# Patient Record
Sex: FEMALE | Race: WHITE | Employment: OTHER | URBAN - METROPOLITAN AREA
[De-identification: names, ages, dates, MRNs, and addresses within clinical notes are randomized per-mention and may not be internally consistent; named-entity substitution may affect disease eponyms.]

---

## 2019-08-25 ENCOUNTER — APPOINTMENT (OUTPATIENT)
Dept: CT IMAGING | Facility: CLINIC | Age: 59
End: 2019-08-25
Attending: FAMILY MEDICINE
Payer: COMMERCIAL

## 2019-08-25 ENCOUNTER — APPOINTMENT (OUTPATIENT)
Dept: GENERAL RADIOLOGY | Facility: CLINIC | Age: 59
End: 2019-08-25
Attending: FAMILY MEDICINE
Payer: COMMERCIAL

## 2019-08-25 ENCOUNTER — HOSPITAL ENCOUNTER (EMERGENCY)
Facility: CLINIC | Age: 59
Discharge: ANOTHER HOSPITAL | End: 2019-08-25
Attending: FAMILY MEDICINE | Admitting: FAMILY MEDICINE
Payer: COMMERCIAL

## 2019-08-25 VITALS
WEIGHT: 220.46 LBS | RESPIRATION RATE: 24 BRPM | HEART RATE: 82 BPM | OXYGEN SATURATION: 95 % | SYSTOLIC BLOOD PRESSURE: 132 MMHG | DIASTOLIC BLOOD PRESSURE: 95 MMHG | TEMPERATURE: 97.7 F | BODY MASS INDEX: 37.64 KG/M2 | HEIGHT: 64 IN

## 2019-08-25 DIAGNOSIS — I46.9 CARDIAC ARREST (H): ICD-10-CM

## 2019-08-25 DIAGNOSIS — E87.5 HYPERKALEMIA: ICD-10-CM

## 2019-08-25 DIAGNOSIS — R73.9 HYPERGLYCEMIA: ICD-10-CM

## 2019-08-25 LAB
ALBUMIN SERPL-MCNC: 3.2 G/DL (ref 3.4–5)
ALBUMIN UR-MCNC: 100 MG/DL
ALP SERPL-CCNC: 320 U/L (ref 40–150)
ALT SERPL W P-5'-P-CCNC: 432 U/L (ref 0–50)
AMORPH CRY #/AREA URNS HPF: ABNORMAL /HPF
ANION GAP SERPL CALCULATED.3IONS-SCNC: 6 MMOL/L (ref 3–14)
ANION GAP SERPL CALCULATED.3IONS-SCNC: 8 MMOL/L (ref 3–14)
APPEARANCE UR: ABNORMAL
AST SERPL W P-5'-P-CCNC: 382 U/L (ref 0–45)
BASE DEFICIT BLDV-SCNC: 4.3 MMOL/L
BASE DEFICIT BLDV-SCNC: 7.6 MMOL/L
BASE DEFICIT BLDV-SCNC: 7.9 MMOL/L
BASOPHILS # BLD AUTO: 0.1 10E9/L (ref 0–0.2)
BASOPHILS NFR BLD AUTO: 0.3 %
BILIRUB SERPL-MCNC: 0.7 MG/DL (ref 0.2–1.3)
BILIRUB UR QL STRIP: NEGATIVE
BUN SERPL-MCNC: 40 MG/DL (ref 7–30)
BUN SERPL-MCNC: 46 MG/DL (ref 7–30)
CALCIUM SERPL-MCNC: 7.5 MG/DL (ref 8.5–10.1)
CALCIUM SERPL-MCNC: 9.2 MG/DL (ref 8.5–10.1)
CHLORIDE SERPL-SCNC: 100 MMOL/L (ref 94–109)
CHLORIDE SERPL-SCNC: 111 MMOL/L (ref 94–109)
CO2 SERPL-SCNC: 24 MMOL/L (ref 20–32)
CO2 SERPL-SCNC: 26 MMOL/L (ref 20–32)
COLOR UR AUTO: YELLOW
CREAT SERPL-MCNC: 1.28 MG/DL (ref 0.52–1.04)
CREAT SERPL-MCNC: 1.53 MG/DL (ref 0.52–1.04)
DIFFERENTIAL METHOD BLD: ABNORMAL
EOSINOPHIL # BLD AUTO: 0 10E9/L (ref 0–0.7)
EOSINOPHIL NFR BLD AUTO: 0 %
ERYTHROCYTE [DISTWIDTH] IN BLOOD BY AUTOMATED COUNT: 14.5 % (ref 10–15)
ETHANOL SERPL-MCNC: <0.01 G/DL
GFR SERPL CREATININE-BSD FRML MDRD: 37 ML/MIN/{1.73_M2}
GFR SERPL CREATININE-BSD FRML MDRD: 46 ML/MIN/{1.73_M2}
GLUCOSE BLDC GLUCOMTR-MCNC: 482 MG/DL (ref 70–99)
GLUCOSE BLDC GLUCOMTR-MCNC: 503 MG/DL (ref 70–99)
GLUCOSE SERPL-MCNC: 352 MG/DL (ref 70–99)
GLUCOSE SERPL-MCNC: 587 MG/DL (ref 70–99)
GLUCOSE UR STRIP-MCNC: >499 MG/DL
HCO3 BLDV-SCNC: 23 MMOL/L (ref 21–28)
HCO3 BLDV-SCNC: 24 MMOL/L (ref 21–28)
HCO3 BLDV-SCNC: 24 MMOL/L (ref 21–28)
HCT VFR BLD AUTO: 47.1 % (ref 35–47)
HGB BLD-MCNC: 14.4 G/DL (ref 11.7–15.7)
HGB UR QL STRIP: ABNORMAL
IMM GRANULOCYTES # BLD: 0.3 10E9/L (ref 0–0.4)
IMM GRANULOCYTES NFR BLD: 1.6 %
KETONES BLD-SCNC: 0.5 MMOL/L (ref 0–0.6)
KETONES UR STRIP-MCNC: NEGATIVE MG/DL
LACTATE BLD-SCNC: 3.3 MMOL/L (ref 0.7–2)
LACTATE BLD-SCNC: 4.5 MMOL/L (ref 0.7–2)
LEUKOCYTE ESTERASE UR QL STRIP: NEGATIVE
LYMPHOCYTES # BLD AUTO: 0.8 10E9/L (ref 0.8–5.3)
LYMPHOCYTES NFR BLD AUTO: 3.8 %
MCH RBC QN AUTO: 31.6 PG (ref 26.5–33)
MCHC RBC AUTO-ENTMCNC: 30.6 G/DL (ref 31.5–36.5)
MCV RBC AUTO: 104 FL (ref 78–100)
MONOCYTES # BLD AUTO: 2 10E9/L (ref 0–1.3)
MONOCYTES NFR BLD AUTO: 10.3 %
MUCOUS THREADS #/AREA URNS LPF: PRESENT /LPF
NEUTROPHILS # BLD AUTO: 16.6 10E9/L (ref 1.6–8.3)
NEUTROPHILS NFR BLD AUTO: 84 %
NITRATE UR QL: NEGATIVE
NRBC # BLD AUTO: 0 10*3/UL
NRBC BLD AUTO-RTO: 0 /100
O2/TOTAL GAS SETTING VFR VENT: 40 %
O2/TOTAL GAS SETTING VFR VENT: 40 %
O2/TOTAL GAS SETTING VFR VENT: ABNORMAL %
PCO2 BLDV: 60 MM HG (ref 40–50)
PCO2 BLDV: 78 MM HG (ref 40–50)
PCO2 BLDV: 82 MM HG (ref 40–50)
PH BLDV: 7.08 PH (ref 7.32–7.43)
PH BLDV: 7.08 PH (ref 7.32–7.43)
PH BLDV: 7.22 PH (ref 7.32–7.43)
PH UR STRIP: 5 PH (ref 5–7)
PLATELET # BLD AUTO: 422 10E9/L (ref 150–450)
PO2 BLDV: 17 MM HG (ref 25–47)
PO2 BLDV: 17 MM HG (ref 25–47)
PO2 BLDV: 22 MM HG (ref 25–47)
POTASSIUM SERPL-SCNC: 5.2 MMOL/L (ref 3.4–5.3)
POTASSIUM SERPL-SCNC: 6.1 MMOL/L (ref 3.4–5.3)
PROT SERPL-MCNC: 7.4 G/DL (ref 6.8–8.8)
RBC # BLD AUTO: 4.55 10E12/L (ref 3.8–5.2)
RBC #/AREA URNS AUTO: 6 /HPF (ref 0–2)
SODIUM SERPL-SCNC: 134 MMOL/L (ref 133–144)
SODIUM SERPL-SCNC: 141 MMOL/L (ref 133–144)
SOURCE: ABNORMAL
SP GR UR STRIP: 1.02 (ref 1–1.03)
UROBILINOGEN UR STRIP-MCNC: 0 MG/DL (ref 0–2)
WBC # BLD AUTO: 19.8 10E9/L (ref 4–11)
WBC #/AREA URNS AUTO: 5 /HPF (ref 0–5)

## 2019-08-25 PROCEDURE — 81001 URINALYSIS AUTO W/SCOPE: CPT | Performed by: FAMILY MEDICINE

## 2019-08-25 PROCEDURE — 99292 CRITICAL CARE ADDL 30 MIN: CPT

## 2019-08-25 PROCEDURE — 82803 BLOOD GASES ANY COMBINATION: CPT | Performed by: FAMILY MEDICINE

## 2019-08-25 PROCEDURE — 25000131 ZZH RX MED GY IP 250 OP 636 PS 637: Performed by: FAMILY MEDICINE

## 2019-08-25 PROCEDURE — 96376 TX/PRO/DX INJ SAME DRUG ADON: CPT

## 2019-08-25 PROCEDURE — 85025 COMPLETE CBC W/AUTO DIFF WBC: CPT | Performed by: FAMILY MEDICINE

## 2019-08-25 PROCEDURE — 93005 ELECTROCARDIOGRAM TRACING: CPT | Mod: 76

## 2019-08-25 PROCEDURE — 25800030 ZZH RX IP 258 OP 636: Performed by: FAMILY MEDICINE

## 2019-08-25 PROCEDURE — 70450 CT HEAD/BRAIN W/O DYE: CPT

## 2019-08-25 PROCEDURE — 82010 KETONE BODYS QUAN: CPT | Performed by: FAMILY MEDICINE

## 2019-08-25 PROCEDURE — 00000146 ZZHCL STATISTIC GLUCOSE BY METER IP

## 2019-08-25 PROCEDURE — 25000128 H RX IP 250 OP 636: Performed by: FAMILY MEDICINE

## 2019-08-25 PROCEDURE — 87040 BLOOD CULTURE FOR BACTERIA: CPT | Performed by: FAMILY MEDICINE

## 2019-08-25 PROCEDURE — 93005 ELECTROCARDIOGRAM TRACING: CPT

## 2019-08-25 PROCEDURE — 71045 X-RAY EXAM CHEST 1 VIEW: CPT

## 2019-08-25 PROCEDURE — 99292 CRITICAL CARE ADDL 30 MIN: CPT | Mod: Z6 | Performed by: FAMILY MEDICINE

## 2019-08-25 PROCEDURE — 93010 ELECTROCARDIOGRAM REPORT: CPT | Mod: 76 | Performed by: FAMILY MEDICINE

## 2019-08-25 PROCEDURE — 80053 COMPREHEN METABOLIC PANEL: CPT | Performed by: FAMILY MEDICINE

## 2019-08-25 PROCEDURE — 87086 URINE CULTURE/COLONY COUNT: CPT | Performed by: FAMILY MEDICINE

## 2019-08-25 PROCEDURE — 80320 DRUG SCREEN QUANTALCOHOLS: CPT | Performed by: FAMILY MEDICINE

## 2019-08-25 PROCEDURE — 80048 BASIC METABOLIC PNL TOTAL CA: CPT | Performed by: FAMILY MEDICINE

## 2019-08-25 PROCEDURE — 99291 CRITICAL CARE FIRST HOUR: CPT | Mod: 25

## 2019-08-25 PROCEDURE — 40000275 ZZH STATISTIC RCP TIME EA 10 MIN

## 2019-08-25 PROCEDURE — 99291 CRITICAL CARE FIRST HOUR: CPT | Mod: 25 | Performed by: FAMILY MEDICINE

## 2019-08-25 PROCEDURE — 31500 INSERT EMERGENCY AIRWAY: CPT

## 2019-08-25 PROCEDURE — 96374 THER/PROPH/DIAG INJ IV PUSH: CPT

## 2019-08-25 PROCEDURE — 96375 TX/PRO/DX INJ NEW DRUG ADDON: CPT

## 2019-08-25 PROCEDURE — 93010 ELECTROCARDIOGRAM REPORT: CPT | Mod: Z6 | Performed by: FAMILY MEDICINE

## 2019-08-25 PROCEDURE — 40000281 ZZH STATISTIC TRANSPORT TIME EA 15 MIN

## 2019-08-25 PROCEDURE — 83605 ASSAY OF LACTIC ACID: CPT | Performed by: FAMILY MEDICINE

## 2019-08-25 PROCEDURE — 51702 INSERT TEMP BLADDER CATH: CPT

## 2019-08-25 PROCEDURE — 25000125 ZZHC RX 250: Performed by: FAMILY MEDICINE

## 2019-08-25 PROCEDURE — 71275 CT ANGIOGRAPHY CHEST: CPT

## 2019-08-25 PROCEDURE — 40000276 ZZH STATISTIC RCP TIME ED VENT EA 10 MIN

## 2019-08-25 PROCEDURE — 74177 CT ABD & PELVIS W/CONTRAST: CPT

## 2019-08-25 RX ORDER — IOPAMIDOL 755 MG/ML
60 INJECTION, SOLUTION INTRAVASCULAR ONCE
Status: COMPLETED | OUTPATIENT
Start: 2019-08-25 | End: 2019-08-25

## 2019-08-25 RX ORDER — CEFAZOLIN SODIUM 1 G/50ML
2000 SOLUTION INTRAVENOUS EVERY 24 HOURS
Status: DISCONTINUED | OUTPATIENT
Start: 2019-08-25 | End: 2019-08-25 | Stop reason: HOSPADM

## 2019-08-25 RX ORDER — IOPAMIDOL 755 MG/ML
89 INJECTION, SOLUTION INTRAVASCULAR ONCE
Status: COMPLETED | OUTPATIENT
Start: 2019-08-25 | End: 2019-08-25

## 2019-08-25 RX ORDER — LIRAGLUTIDE 6 MG/ML
INJECTION SUBCUTANEOUS
COMMUNITY
Start: 2019-08-14

## 2019-08-25 RX ORDER — MIRTAZAPINE 15 MG/1
15 TABLET, FILM COATED ORAL AT BEDTIME
COMMUNITY
Start: 2019-07-19

## 2019-08-25 RX ORDER — LORAZEPAM 2 MG/ML
2 INJECTION INTRAMUSCULAR ONCE
Status: DISCONTINUED | OUTPATIENT
Start: 2019-08-25 | End: 2019-08-25 | Stop reason: HOSPADM

## 2019-08-25 RX ORDER — CLONAZEPAM 0.5 MG/1
0.25 TABLET ORAL DAILY
COMMUNITY
Start: 2019-08-12

## 2019-08-25 RX ORDER — DEXTROSE MONOHYDRATE 25 G/50ML
25-50 INJECTION, SOLUTION INTRAVENOUS
Status: DISCONTINUED | OUTPATIENT
Start: 2019-08-25 | End: 2019-08-25 | Stop reason: HOSPADM

## 2019-08-25 RX ORDER — METOPROLOL TARTRATE 50 MG
50 TABLET ORAL EVERY 12 HOURS
COMMUNITY
Start: 2019-08-24 | End: 2020-08-18

## 2019-08-25 RX ORDER — ROSUVASTATIN CALCIUM 20 MG/1
20 TABLET, COATED ORAL DAILY
COMMUNITY
Start: 2019-07-05 | End: 2020-06-29

## 2019-08-25 RX ORDER — CALCIUM GLUCONATE 94 MG/ML
1 INJECTION, SOLUTION INTRAVENOUS ONCE
Status: DISCONTINUED | OUTPATIENT
Start: 2019-08-25 | End: 2019-08-25 | Stop reason: DRUGHIGH

## 2019-08-25 RX ADMIN — SODIUM CHLORIDE 100 ML: 9 INJECTION, SOLUTION INTRAVENOUS at 16:44

## 2019-08-25 RX ADMIN — NOREPINEPHRINE BITARTRATE 0.03 MCG/KG/MIN: 1 INJECTION INTRAVENOUS at 15:12

## 2019-08-25 RX ADMIN — FENTANYL CITRATE 50 MCG/HR: 50 INJECTION, SOLUTION INTRAMUSCULAR; INTRAVENOUS at 15:07

## 2019-08-25 RX ADMIN — CEFEPIME HYDROCHLORIDE 2 G: 2 INJECTION, POWDER, FOR SOLUTION INTRAVENOUS at 16:59

## 2019-08-25 RX ADMIN — SODIUM CHLORIDE 100 ML: 9 INJECTION, SOLUTION INTRAVENOUS at 16:25

## 2019-08-25 RX ADMIN — SODIUM CHLORIDE 5.5 UNITS/HR: 9 INJECTION, SOLUTION INTRAVENOUS at 15:48

## 2019-08-25 RX ADMIN — FENTANYL CITRATE 50 MCG/HR: 50 INJECTION, SOLUTION INTRAMUSCULAR; INTRAVENOUS at 15:14

## 2019-08-25 RX ADMIN — VANCOMYCIN HYDROCHLORIDE 2000 MG: 10 INJECTION, POWDER, LYOPHILIZED, FOR SOLUTION INTRAVENOUS at 17:25

## 2019-08-25 RX ADMIN — IOPAMIDOL 60 ML: 755 INJECTION, SOLUTION INTRAVENOUS at 16:44

## 2019-08-25 RX ADMIN — IOPAMIDOL 75 ML: 755 INJECTION, SOLUTION INTRAVENOUS at 16:24

## 2019-08-25 RX ADMIN — NOREPINEPHRINE BITARTRATE 0.03 MCG/KG/MIN: 1 INJECTION INTRAVENOUS at 15:14

## 2019-08-25 RX ADMIN — INSULIN HUMAN 10 UNITS: 100 INJECTION, SOLUTION PARENTERAL at 15:22

## 2019-08-25 ASSESSMENT — MIFFLIN-ST. JEOR: SCORE: 1570

## 2019-08-25 NOTE — ED NOTES
DATE:  8/25/2019   TIME OF RECEIPT FROM LAB:  4686  LAB TEST:  lactate  LAB VALUE:  2.9  RESULTS GIVEN WITH READ-BACK TO (PROVIDER):  Maria De Jesus [facility] LAB VALUE REPORTED TO PROVIDER:   [id number]

## 2019-08-25 NOTE — ED TRIAGE NOTES
Pt arrived in triage with c/o elevated glucose level. Pt has been having some altered mental state through the night so family brought her in.

## 2019-08-25 NOTE — ED PROVIDER NOTES
History     Chief Complaint   Patient presents with     Hyperglycemia     HPI    Lindsey Diggs is a 59 year old female who was brought in by her  because of altered consciousness.  He said she is been acting somewhat abnormally and she is complained of lightheadedness.  They are here visiting a daughter and they live in [facility].  He stated that she completed 7 out of 7 electroconvulsive therapy treatments on 8/23, 2 days ago and that this was complicated by the development of atrial fibrillation necessitating hospitalization.  He said that she was put on a medication drip and the A. fib resolved.  She was not discharged on anticoagulation.  On her initial presentation we did not have any additional medical history but after her cardiac arrest I was able to access charts through care everywhere that shows that she is had a prior history of stroke affecting the brainstem for which she is on Plavix, type 2 diabetes currently on insulin, severe chronic depression, sleep apnea, and other medical problems as noted below.    On my initial assessment she smiled and greeted me.  As I was taking further history she began to become stiff and twist her head and she became unresponsive.  I thought she was developing a seizure and ordered lorazepam 2 mg to be given IV.  She then stopped breathing and lost her pulse and CPR was initiated.    Allergies  Reconcile with Patient's Chart    Active Allergy Reactions Severity Noted Date Comments   Penicillins Anaphylaxis   01/24/2005 Tolerated cephalexin     Medications  Reconcile with Patient's Chart    Medication Sig Dispensed Refills Start Date End Date Status   Lactobacillus-Inulin (PixtaE DIGESTIVE HEALTH) oral CAPS capsule   Take 1 capsule by mouth every morning before breakfast.   0     Active   ACCU-CHEK SMARTVIEW in vitro strips   TEST BLOOD SUGARS FOUR TIMES DAILY 400 strip   4 10/03/2018   Active   empagliflozin (JARDIANCE) 25 mg oral TABS tablet    Take 25 mg by mouth every day. 90 tablet   3 11/06/2018   Active   clopidogrel (PLAVIX) 75 mg oral tablet   Take 1 tablet (75 mg total) by mouth every day. 90 tablet   3 11/06/2018   Active   ACCU-CHEK FASTCLIX LANCETS misc. route MISC   USE TO TEST BLOOD SUGAR FOUR TIMES DAILY AND AS NEEDED. 300 each   3 11/06/2018   Active   Multiple Vitamins-Minerals (MULTIVITAMIN ADULTS) oral TABS tablet   Take 1 tablet by mouth every day.   0     Active   fosinopril (MONOPRIL) 10 mg oral tablet   Take 1 tablet (10 mg total) by mouth every day. 90 tablet   4 01/08/2019   Active   Cholecalciferol (VITAMIN D3) 5,000 Units oral TABS tablet   Take 5,000 Units by mouth every day .   0     Active   rosuvastatin (CRESTOR) 20 mg oral tablet   Take 1 tablet (20 mg total) by mouth every day. 30 tablet   11 07/05/2019 06/29/2020 Active   mirtazapine (REMERON) 15 mg oral tablet   Take 1 tablet (15 mg total) by mouth every night at bedtime. 30 tablet   1 07/19/2019   Active   clonazePAM (KLONOPIN) 0.5 mg oral tablet   Take 0.5 tablets (0.25 mg total) by mouth every day. 1 tablet   0 08/12/2019   Active   liraglutide (VICTOZA) 18 mg/3 mL subcutaneous injection    Indications: Diabetes Mellitus 0.6 mg subcutaneously x 7 days, then 1.2 mg subcut once daily. Indications: Diabetes 6 mL   5 08/14/2019   Active   metoprolol tartrate (LOPRESSOR) 50 mg oral tablet   Take 1 tablet (50 mg total) by mouth every 12 hours. 60 tablet   11 08/24/2019 08/18/2020 Active   acetaminophen (TYLENOL) 500 mg oral tablet   Take 500 mg by mouth every 6 hours as needed for pain .   0   08/20/2019 Discontinued   insulin glargine (LANTUS) 100 Units/mL subcutaneous injection   Inject 17 Units into the skin every 12 hours. 10 mL   11 11/06/2018 08/14/2019 Discontinued   insulin pen needle (BD PEN NEEDLE GRACIELA U/F) 32 G x 4 mm misc. route MISC   To be used to inject insulin 5 times per day. DX: 250.00. 450 each   3 11/06/2018 08/14/2019 Discontinued   Unclassified (OTHER)    "Take by mouth every day \"Restore - Gut capsule\" .   0   08/14/2019 Discontinued   LANTUS SOLOSTAR 100 Units/mL subcutaneous injection (pen)   INJECT 17 UNITS INTO THE SKIN Q 12 H   10 06/09/2019 08/14/2019 Discontinued   ginkgo biloba (GINKGO BILOBA) 40 mg oral tablet       0   08/20/2019 Discontinued   metoprolol tartrate (LOPRESSOR) 25 mg oral tablet   Take 0.5 tablets (12.5 mg total) by mouth twice daily. 30 tablet   0 07/19/2019 08/15/2019 Discontinued   DULoxetine, enteric-coated particles, (CYMBALTA) 30 mg oral capsule   Take one tab daily for one week, then discontinue. 7 capsule   0 07/19/2019 07/31/2019 Discontinued   clonazePAM (KLONOPIN) 0.5 mg oral tablet   Take 0.5 tablets (0.25 mg total) by mouth three times daily. 45 tablet   1 07/19/2019 08/12/2019 Discontinued   metoprolol tartrate (LOPRESSOR) 25 mg oral tablet   TAKE 1/2 TABLET(12.5 MG) BY MOUTH TWICE DAILY 30 tablet   0 08/15/2019 08/24/2019 Discontinued   Active Problems  Reconcile with Patient's Chart    Problem Noted Date   Hyperkalemia 08/24/2019   Paroxysmal atrial fibrillation 08/23/2019   Major depression 08/01/2019   Abnormal stress ECG with treadmill 07/23/2019   Abnormal stress test 07/19/2019   History of cerebrovascular accident with left-sided residual deficit 12/13/2018   CAP (community acquired pneumonia) 12/08/2018   Cerebrovascular accident (CVA) due to occlusion of left vertebral artery 11/06/2018   Acute medication-induced akathisia 08/05/2018   Severe episode of recurrent major depressive disorder, without psychotic features 08/04/2018   Severe depression 07/23/2018   Medication monitoring encounter 06/12/2018   Stroke syndrome 05/01/2018   Microalbuminuria 11/16/2015   Comprehensive diabetic foot examination, type 2 DM, encounter for 11/10/2014   Type 2 diabetes, HbA1c goal < 7% 07/16/2014   Overview:     Novolog 5 Units QID. SS QID: at 08, 12 and 1800: 1 Unit at 151-175, 2 Units 176-200, 3 Units 201-225, 4 Units 226-250, 5 " "Units 251-300. At bedtime SS: 151-200 1 Unit, 201-250 2 Units, and 251-300 3 Units.     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       Past Medical History:    No past medical history on file.    Past Surgical History:    No past surgical history on file.    Family History:    No family history on file.    Social History:  Marital Status:   [2]  Social History     Tobacco Use     Smoking status: Not on file   Substance Use Topics     Alcohol use: Not on file     Drug use: Not on file        Medications:      blood glucose (ACCU-CHEK SMARTVIEW) test strip   clonazePAM (KLONOPIN) 0.5 MG tablet   liraglutide (VICTOZA) 18 MG/3ML solution   metoprolol tartrate (LOPRESSOR) 50 MG tablet   mirtazapine (REMERON) 15 MG tablet   rosuvastatin (CRESTOR) 20 MG tablet   cholecalciferol (VITAMIN D3) 5000 units TABS tablet         Review of Systems    All other systems are reviewed and are negative    Physical Exam   BP: 126/71  Pulse: 85  Heart Rate: 84  Temp: 93.9  F (34.4  C)  Resp: (!) 47  Height: 162.6 cm (5' 4\")  Weight: 100 kg (220 lb 7.4 oz)  SpO2: 93 %      Physical Exam  Nursing note and vitals were reviewed.  Constitutional: Initial evaluation, the patient was awake and alert, morbidloy obese and somewhat pale appearing 59-year-old in no apparent discomfort.  During my initial evaluation she lost consciousness and sustained cardiac arrest.  HEENT: Atraumatic and normocephalic.  Conjugate gaze. PERRL  Neck: Freely mobile prior to arrest.  Cardiovascular: Cardiac examination reveals normal heart rate and regular rhythm without murmur prior to her " cardiac arrest  Pulmonary/Chest: Breathing is unlabored.  Breath sounds are clear and equal bilaterally.  There no retractions, tachypnea, rales, wheezes, or rhonchi.  Abdomen: Obese, soft, nontender, no HSM or masses rebound or guarding.  Musculoskeletal: Extremities are cool and mildly cyanotic.  Neurological: Initially she was alert but appeared somewhat confused and she rapidly changed during the course of my interview until she is sustained cardiac arrest.      ED Course        Procedures               EKG Interpretation:      Interpreted by Marlon Pretty MD  Time reviewed:   Symptoms at time of EK:23  Rhythm: normal sinus   Rate: normal  Axis: normal  Ectopy: Ventricular bigeminy  Conduction: Nonspecific QRS widening  ST Segments/ T Waves: No ST-T wave changes  Q Waves: none  Comparison to prior: No old EKG available    Clinical Impression: Ventricular bigeminy with underlying sinus rhythm with nonspecific QRS widening         EKG Interpretation: 2     Interpreted by Marlon Pretty MD  Time reviewed:14:57   Symptoms at time of EKG: NA   Rhythm: normal sinus   Rate: 92  Axis: NORMAL  Ectopy: none  Conduction: Poor R wave progression  ST Segments/ T Waves: No ST-T wave changes  Q Waves: none  Comparison to prior: Compared to the EKG above, ventricular bigeminy has resolved.    Clinical Impression: Poor R wave progression, otherwise normal EKG      Critical Care time:  was 90 minutes for this patient excluding procedures.  The patient has signs of possible Severe Sepsis as evidenced by:    1. 2 SIRS criteria, AND  2. Possible infection, AND   3. Organ dysfunction:  SBP <90, MAP < 65, or SBP decrease of >40 from baseline due to infection, Lactic Acid > 1.9 and Acute encephalopathy due to sepsis    Time severe sepsis diagnosis confirmed = lactate as this was the time when Lactate resulted, and the level was > 1.9      3 Hour Severe Sepsis Bundle Completion:  1. Initial Lactic Acid Result:   Recent Labs   Lab  Test 08/25/19  1554 08/25/19  1408   LACT 4.5* 3.3*     2. Blood Cultures before Antibiotics: Yes  3. Broad Spectrum Antibiotics Administered: Yes     Anti-infectives (From admission through now)    Start     Dose/Rate Route Frequency Ordered Stop    08/25/19 1533  vancomycin (VANCOCIN) 2,000 mg in sodium chloride 0.9 % 500 mL intermittent infusion      2,000 mg  over 2 Hours Intravenous EVERY 24 HOURS 08/25/19 1533      08/25/19 1525  ceFEPIme (MAXIPIME) 2 g vial to attach to  ml bag for ADULTS or 50 ml bag for PEDS      2 g  over 30 Minutes Intravenous ONCE 08/25/19 1524 08/25/19 1729        4. 3000 ml of IV fluids.  Ideal body weight: 54.7 kg (120 lb 9.5 oz)  Adjusted ideal body weight: 72.8 kg (160 lb 8.6 oz)    Severe Sepsis reassessment:  1. Repeat Lactic Acid Level: 4.5  2. Vasopressors started for Persistent Hypotension defined by the last 2 BP readings within the ONE HOUR follwing completion of the 30mL/kg bolus being low (SBP <90 or MAP <65).  I attest to having performed a repeat sepsis exam and assessment of perfusion at 16:30 and the results demonstrate improved perfusion.              Results for orders placed or performed during the hospital encounter of 08/25/19 (from the past 24 hour(s))   Glucose by meter   Result Value Ref Range    Glucose 503 (HH) 70 - 99 mg/dL   CBC with platelets differential   Result Value Ref Range    WBC 19.8 (H) 4.0 - 11.0 10e9/L    RBC Count 4.55 3.8 - 5.2 10e12/L    Hemoglobin 14.4 11.7 - 15.7 g/dL    Hematocrit 47.1 (H) 35.0 - 47.0 %     (H) 78 - 100 fl    MCH 31.6 26.5 - 33.0 pg    MCHC 30.6 (L) 31.5 - 36.5 g/dL    RDW 14.5 10.0 - 15.0 %    Platelet Count 422 150 - 450 10e9/L    Diff Method Automated Method     % Neutrophils 84.0 %    % Lymphocytes 3.8 %    % Monocytes 10.3 %    % Eosinophils 0.0 %    % Basophils 0.3 %    % Immature Granulocytes 1.6 %    Nucleated RBCs 0 0 /100    Absolute Neutrophil 16.6 (H) 1.6 - 8.3 10e9/L    Absolute Lymphocytes 0.8 0.8  - 5.3 10e9/L    Absolute Monocytes 2.0 (H) 0.0 - 1.3 10e9/L    Absolute Eosinophils 0.0 0.0 - 0.7 10e9/L    Absolute Basophils 0.1 0.0 - 0.2 10e9/L    Abs Immature Granulocytes 0.3 0 - 0.4 10e9/L    Absolute Nucleated RBC 0.0    Comprehensive metabolic panel   Result Value Ref Range    Sodium 134 133 - 144 mmol/L    Potassium 6.1 (HH) 3.4 - 5.3 mmol/L    Chloride 100 94 - 109 mmol/L    Carbon Dioxide 26 20 - 32 mmol/L    Anion Gap 8 3 - 14 mmol/L    Glucose 587 (HH) 70 - 99 mg/dL    Urea Nitrogen 46 (H) 7 - 30 mg/dL    Creatinine 1.53 (H) 0.52 - 1.04 mg/dL    GFR Estimate 37 (L) >60 mL/min/[1.73_m2]    GFR Estimate If Black 43 (L) >60 mL/min/[1.73_m2]    Calcium 9.2 8.5 - 10.1 mg/dL    Bilirubin Total 0.7 0.2 - 1.3 mg/dL    Albumin 3.2 (L) 3.4 - 5.0 g/dL    Protein Total 7.4 6.8 - 8.8 g/dL    Alkaline Phosphatase 320 (H) 40 - 150 U/L     (H) 0 - 50 U/L     (H) 0 - 45 U/L   Lactic acid whole blood   Result Value Ref Range    Lactic Acid 3.3 (H) 0.7 - 2.0 mmol/L   XR Chest Port 1 View    Narrative    CHEST ONE VIEW PORTABLE   8/25/2019 2:43 PM     HISTORY: Hypoglycemia    COMPARISON: None.      Impression    IMPRESSION: Opacities at both lower lungs consistent with bilateral  pleural effusions left greater than right. Pulmonary vessels may be  normal for supine position. Perihilar opacity on the right consistent  with interstitial infiltrate or edema.    ELVA ROMERO MD   CT Head w/o Contrast    Narrative    CT OF THE HEAD WITHOUT CONTRAST 8/25/2019 3:00 PM     COMPARISON: None.    HISTORY:  Coma.    TECHNIQUE: Axial CT images of the head from the skull base to the  vertex were acquired without IV contrast.    FINDINGS: The ventricles and basal cisterns are within normal limits  in configuration. There is no midline shift. There are no extra-axial  fluid collections.  Gray-white differentiation is well maintained.    No intracranial hemorrhage, mass or recent infarct.    The visualized paranasal  sinuses are well-aerated. There is no  mastoiditis. There are no fractures of the visualized bones.      Impression    IMPRESSION:  Normal head CT.      Radiation dose for this scan was reduced using automated exposure  control, adjustment of the mA and/or kV according to patient size, or  iterative reconstruction technique   UA reflex to Microscopic and Culture   Result Value Ref Range    Color Urine Yellow     Appearance Urine Cloudy     Glucose Urine >499 (A) NEG^Negative mg/dL    Bilirubin Urine Negative NEG^Negative    Ketones Urine Negative NEG^Negative mg/dL    Specific Gravity Urine 1.022 1.003 - 1.035    Blood Urine Small (A) NEG^Negative    pH Urine 5.0 5.0 - 7.0 pH    Protein Albumin Urine 100 (A) NEG^Negative mg/dL    Urobilinogen mg/dL 0.0 0.0 - 2.0 mg/dL    Nitrite Urine Negative NEG^Negative    Leukocyte Esterase Urine Negative NEG^Negative    Source Catheterized Urine     RBC Urine 6 (H) 0 - 2 /HPF    WBC Urine 5 0 - 5 /HPF    Mucous Urine Present (A) NEG^Negative /LPF    Amorphous Crystals Moderate (A) NEG^Negative /HPF   Blood gas venous   Result Value Ref Range    Ph Venous 7.08 (LL) 7.32 - 7.43 pH    PCO2 Venous 78 (HH) 40 - 50 mm Hg    PO2 Venous 22 (L) 25 - 47 mm Hg    Bicarbonate Venous 23 21 - 28 mmol/L    Base Deficit Venous 7.9 mmol/L    FIO2 40%    Ketone Beta-Hydroxybutyrate Quantitative   Result Value Ref Range    Ketone Quantitative 0.5 0.0 - 0.6 mmol/L   Alcohol ethyl   Result Value Ref Range    Ethanol g/dL <0.01 <0.01 g/dL   Glucose by meter   Result Value Ref Range    Glucose 482 (H) 70 - 99 mg/dL   Blood gas venous   Result Value Ref Range    Ph Venous 7.08 (LL) 7.32 - 7.43 pH    PCO2 Venous 82 (HH) 40 - 50 mm Hg    PO2 Venous 17 (L) 25 - 47 mm Hg    Bicarbonate Venous 24 21 - 28 mmol/L    Base Deficit Venous 7.6 mmol/L    FIO2 40    Lactic acid whole blood   Result Value Ref Range    Lactic Acid 4.5 (HH) 0.7 - 2.0 mmol/L   CT Chest (PE) Abdomen Pelvis w Contrast    Narrative     EXAM: CT CHEST PE ABDOMEN PELVIS W CONTRAST  LOCATION: [facility]  DATE/TIME: 8/25/2019 4:14 PM    INDICATION: Cardiac arrest.  COMPARISON: None.  TECHNIQUE: Helical acquisition through the chest was performed during the arterial phase of contrast enhancement using IV Contrast. 2D and 3D reconstructions performed by the CT technologist. CT abdomen and pelvis were also performed in the portal venous   phase of contrast enhancement. Dose reduction techniques were used.   IV CONTRAST: 135 mL Isovue 370.    FINDINGS:  ANGIOGRAM: No pulmonary embolus, aortic aneurysm or dissection. Atherosclerotic plaque including coronary artery calcification. Borderline high-grade stenosis proximal right vertebral artery.    LUNGS AND PLEURA: Moderate to large right pleural effusion. Moderate left pleural effusion. Dense consolidation and volume loss most prominent in the lower lobes bilaterally. Additional scattered areas of fibroatelectasis. Air trapping often associated   with small vessel or small airways disease.    MEDIASTINUM: ET tube tip just above ton could be withdrawn 1 to 2 cm. Enteric tube tip in distal esophagus should be advanced at least 10 cm. Moderate to large pericardial effusion. Enlarged heart. No mass or adenopathy.    ABDOMEN: Images degraded due to motion artifact and patient's arm position.    Small volume ascites. Cholecystectomy. Pancreatic atrophy and fatty replacement. Degraded images of liver, spleen, adrenal glands, and kidneys unremarkable with exception of a lobular contour to the liver and right renal scarring. Atherosclerotic plaque   without aortic aneurysm. No adenopathy.    PELVIS: Bradford catheter within a decompressed urinary bladder. Small volume ascites. Hysterectomy. No bowel obstruction. Diastases anterior abdominal wall. Diverticulosis without diverticulitis.    MUSCULOSKELETAL: Degenerative disease. Scoliosis. Potential nerve root impingement.      Impression     CONCLUSION:  1.  ET tube tip just above ton could be withdrawn 1 to 2 cm. Enteric tube tip in distal esophagus should be advanced at least 10 cm.  2.  Moderate to large pericardial effusion.  3.  Moderate to large right pleural effusion with moderate left pleural effusion with dense consolidation and volume loss most prominent in the lower lobes bilaterally. Additional scattered areas of fibroatelectasis. Air trapping often associated with   small vessel or small airways disease. Follow-up recommended to ensure resolution and exclude underlying pathology.  4.  Diverticulosis without diverticulitis.  5.  Small volume ascites. Lobular liver contour, correlate clinically regarding possible cirrhosis.  6.  Atherosclerotic plaque including coronary artery calcification. No aortic aneurysm or dissection. No pulmonary emboli.   Basic metabolic panel   Result Value Ref Range    Sodium 141 133 - 144 mmol/L    Potassium 5.2 3.4 - 5.3 mmol/L    Chloride 111 (H) 94 - 109 mmol/L    Carbon Dioxide 24 20 - 32 mmol/L    Anion Gap 6 3 - 14 mmol/L    Glucose 352 (H) 70 - 99 mg/dL    Urea Nitrogen 40 (H) 7 - 30 mg/dL    Creatinine 1.28 (H) 0.52 - 1.04 mg/dL    GFR Estimate 46 (L) >60 mL/min/[1.73_m2]    GFR Estimate If Black 54 (L) >60 mL/min/[1.73_m2]    Calcium 7.5 (L) 8.5 - 10.1 mg/dL   Blood gas venous   Result Value Ref Range    Ph Venous 7.22 (L) 7.32 - 7.43 pH    PCO2 Venous 60 (H) 40 - 50 mm Hg    PO2 Venous 17 (L) 25 - 47 mm Hg    Bicarbonate Venous 24 21 - 28 mmol/L    Base Deficit Venous 4.3 mmol/L    FIO2 40        Medications   LORazepam (ATIVAN) injection 2 mg (has no administration in time range)   fentaNYL (SUBLIMAZE) infusion (50 mcg/hr Intravenous New Bag 8/25/19 1514)   fentaNYL (SUBLIMAZE) infusion (has no administration in time range)   norepinephrine (LEVOPHED) 16 mg in sodium chloride 0.9 % 250 mL infusion (0 mcg/kg/min × 100 kg Intravenous Stopped 8/25/19 1558)   calcium gluconate 1 g in D5W 100 mL  intermittent infusion (has no administration in time range)   dextrose 5% and 0.45% NaCl infusion (has no administration in time range)   dextrose 50 % injection 25-50 mL (has no administration in time range)   insulin 1 unit/1 mL in NS (NovoLIN, HumuLIN Regular) drip -ED DKA algorithm (5.5 Units/hr Intravenous New Bag 8/25/19 1548)   vancomycin (VANCOCIN) 2,000 mg in sodium chloride 0.9 % 500 mL intermittent infusion (2,000 mg Intravenous Given 8/25/19 1725)   insulin (regular) (HumuLIN R/NovoLIN R) injection 10 Units (10 Units Intravenous Given 8/25/19 1522)   ceFEPIme (MAXIPIME) 2 g vial to attach to  ml bag for ADULTS or 50 ml bag for PEDS (2 g Intravenous New Bag 8/25/19 1659)   iopamidol (ISOVUE-370) solution 89 mL (75 mLs Intravenous Given 8/25/19 1624)   sodium chloride 0.9 % bag 500mL for CT scan flush use (100 mLs Intravenous Given 8/25/19 1625)   iopamidol (ISOVUE-370) solution 60 mL (60 mLs Intravenous Given 8/25/19 1644)   sodium chloride 0.9 % bag 500mL for CT scan flush use (100 mLs Intravenous Given 8/25/19 1644)     ED course:    Patient suffered cardiopulmonary arrest that was witnessed by me during initial history taking.  CPR was initiated and she received 30 seconds of manual chest compressions followed by 4 minutes of Robson device CPR.  She received 1 dose of epinephrine 1 mg IV.  She had return of pulse but she did not have return of spontaneous respirations.  She was bagged ventilated, followed by placement of a laryngeal mask airway, followed by intubation using RSI with etomidate and rocuronium.  With return of her pulse she had bigeminy with sinus rhythm and received amiodarone 150 mg IV.  Ventricular ectopic beats resolved.  She gradually progressed to normal sinus rhythm at a normal heart rate in the 80s and 90s.  Following intubation she had a period of prolonged hypotension that did not respond to 3 L of normal saline hydration.  Norepinephrine drip was initiated at 0.03  mcg/kg/min.  It was titrated up to 0.05 mcg/kg/min.  Blood pressure gradually improved to the point where she became slightly hypertensive to 149/100 and norepinephrine drip was titrated back down and eventually discontinued at 1605 hrs.    Initial potassium came back elevated at 6.1.  Initial EKG showing bigeminy with some widening of the QRS complex raise some concern for calcium toxicity and so she received calcium gluconate IV as well as 10 units of regular insulin IV.  Glucose was not given because she was already hyperglycemic above 500.    Following successful resuscitation and intubation and when she was initially stable she was taken to the CT scanner for head CT to rule out intracranial bleed as a cause for her arrest.  This was normal.    Laboratory studies returned showing hyperglycemia and acidosis.  And she was treated with insulin therapy per DKA/HHN protocol.     Laboratory studies showed acidosis with normal ketones and she was given cefepime and vancomycin empirically for possible sepsis.  Blood cultures were obtained.  Urinalysis and urine culture were obtained.  Her and analysis did not show evidence of infection.    1 hour after arrest, venous blood gas was repeated and lactate was repeated.  Venous blood gas was essentially unchanged with pH of 7.08.  Lactate was worsening at 4.5.  He noted dynamically she was improved with normal blood pressure and pulse in a sinus rhythm off of norepinephrine support.    She remained calm and was not bucking the vent on a fentanyl drip at 50 mcg/kg/h.    Nasogastric tube was placed to decompress her stomach.  Bradford catheter was placed.  Initial core body temperature rectally was 93.  Initially we initiated warming but then discontinued because of postarrest    Repeat ABG at 1706 showed pH 7.22.  Repeat lactate was 2.9.  Assessments & Plan (with Medical Decision Making)     59-year-old female with a history of type 2 diabetes on insulin, cerebrovascular  disease, sleep apnea, recently status post electroconvulsive therapy complicated by atrial fibrillation 2 days ago, presented with altered consciousness and generalized weakness and sustained cardiopulmonary arrest in the department.  In retrospect I wonder if her initial seizure-like activity was actually ventricular fibrillation.  At the time that it occurred she was not on cardio monitoring.  She was successfully resuscitated as described above.  She continues to be critically ill.  She requires transfer to ICU and specialty care.  I discussed options with her .  He would like to transfer to [facility].  I do not think it is reasonable to transfer her across the [facility] given the duration of transport.  He is in agreement.  I called and spoke with Dr. Morales, intensivist at [facility] who agreed to accept her in transfer.    While awaiting transfer, we sent her back to CT scan for CT scan of the chest using PE protocol and abdomen and pelvis to further evaluate possible causes of her cardiopulmonary arrest.  At the time of transfer, results of the CT of the chest abdomen and pelvis were still pending.      CT chest abdomen and pelvis returned just prior to transfer.  This showed nasogastric tube in the epigastrium.  It was advanced to 10 cm and was then functioning well as it had been previously but additional gastric contents were able to be aspirated on suction.    Endotracheal tube was just above the ton and this was withdrawn 2 cm with preserved oxygenation.  Other findings as above.    I have reviewed the nursing notes.    I have reviewed the findings, diagnosis, plan and need for follow up with the patient.       New Prescriptions    No medications on file       Final diagnoses:   Cardiac arrest (H)   Hyperglycemia   Hyperkalemia       8/25/2019   [facility] EMERGENCY DEPARTMENT     Marlon Pretty MD  08/25/19 1731       Marlon Pretty MD  08/25/19 8793

## 2019-08-25 NOTE — ED PROVIDER NOTES
"[facility] Procedure Note          Intubation:     Date/Time: 4:00 PM  Performed by: Perfecto Briones MD    The procedure was performed in an emergent situation.  Risks and benefits: risks, benefits and alternatives were discussed.  Patient identity confirmed: verbally with patient and arm band  Time out: Immediately prior to procedure a \"time out\" was called to verify the correct patient, procedure, equipment, support staff and site/side marked as required.    Indication: Acute respiratory failure.    Intubation method: Direct  Patient status: RSI  Preoxygenation: Mask  Pretreatment medications: None  Sedatives: Etomidate  Paralytic: rocuronium  Laryngoscope size: Mac 4  Tube size: 7.5 cuffed with cuff inflated after placement  Number of attempts: 1  Cricoid pressure: yes  Cords visualized: yes  Post-procedure assessment: Breath sounds equal bilaterally with chest rise and absent over the epigastrium, Chest x-ray interpreted by me demonstrating endotracheal tube in appropriate position and CO2 detector.  ETT to teeth: 21 cm  Tube secured with: ETT mcnamara    Patient tolerated the procedure well with no immediate complications.  Complications:  None        Perfecto Briones MD  08/25/19 1600    "

## 2019-08-25 NOTE — ED NOTES
DATE:  8/25/2019   TIME OF RECEIPT FROM LAB:  6707  LAB TEST:  PH   PCO2   PO2   Bicarb  LAB VALUE:  7.08    78    22     23  RESULTS GIVEN WITH READ-BACK TO (PROVIDER):  Maria De Jesus HUGGINS LAB VALUE REPORTED TO PROVIDER:   1522

## 2019-08-25 NOTE — PROGRESS NOTES
Patient intubated via MD without difficulty post bag/valve ventilation for AMS. NG placed post intubation, pt. Transported to CT,place on AC 16 RR 16 , FiO2 40% and +5 PEEP post scan. 1530 vent parameters changed to AC 24, , FiO2 40% and +5 PEEP. No 7.5 ETT secured at 23 at the lip with minimal leak technique, confirmed by equal BBS, TC99M monitoring, and x-ray. Continue to monitor.

## 2019-08-25 NOTE — ED NOTES
DATE:  8/25/2019   TIME OF RECEIPT FROM LAB:  6820  LAB TEST:  PH, PCO2, PO2, HCO3, Lactic Acid  LAB VALUE:  7.08, 82, 17, 24, 4.5  RESULTS GIVEN WITH READ-BACK TO (PROVIDER):  Marlon Pretty MD  TIME LAB VALUE REPORTED TO PROVIDER:   4826

## 2019-08-26 LAB
BACTERIA SPEC CULT: NORMAL
Lab: NORMAL
SPECIMEN SOURCE: NORMAL

## 2019-08-31 LAB
BACTERIA SPEC CULT: NO GROWTH
BACTERIA SPEC CULT: NO GROWTH
Lab: NORMAL
Lab: NORMAL
SPECIMEN SOURCE: NORMAL
SPECIMEN SOURCE: NORMAL

## 2019-10-08 ENCOUNTER — HOSPITAL ENCOUNTER (EMERGENCY)
Facility: CLINIC | Age: 59
Discharge: HOME | End: 2019-10-08
Attending: STUDENT IN AN ORGANIZED HEALTH CARE EDUCATION/TRAINING PROGRAM | Admitting: STUDENT IN AN ORGANIZED HEALTH CARE EDUCATION/TRAINING PROGRAM
Payer: COMMERCIAL

## 2019-10-08 ENCOUNTER — APPOINTMENT (OUTPATIENT)
Dept: ULTRASOUND IMAGING | Facility: CLINIC | Age: 59
End: 2019-10-08
Attending: STUDENT IN AN ORGANIZED HEALTH CARE EDUCATION/TRAINING PROGRAM
Payer: COMMERCIAL

## 2019-10-08 VITALS
HEART RATE: 89 BPM | HEIGHT: 64 IN | DIASTOLIC BLOOD PRESSURE: 73 MMHG | SYSTOLIC BLOOD PRESSURE: 167 MMHG | TEMPERATURE: 98.2 F | OXYGEN SATURATION: 95 % | RESPIRATION RATE: 16 BRPM | WEIGHT: 218 LBS | BODY MASS INDEX: 37.22 KG/M2

## 2019-10-08 DIAGNOSIS — I82.811 SUPERFICIAL THROMBOSIS OF LEG, RIGHT: ICD-10-CM

## 2019-10-08 PROBLEM — G25.71 ACUTE MEDICATION-INDUCED AKATHISIA: Status: ACTIVE | Noted: 2018-08-05

## 2019-10-08 PROBLEM — F32.2 SEVERE DEPRESSION (H): Status: ACTIVE | Noted: 2018-07-23

## 2019-10-08 PROBLEM — R94.39 ABNORMAL CARDIOVASCULAR STRESS TEST: Status: ACTIVE | Noted: 2019-07-19

## 2019-10-08 PROBLEM — I48.0 PAROXYSMAL ATRIAL FIBRILLATION (H): Status: ACTIVE | Noted: 2019-08-23

## 2019-10-08 PROBLEM — T50.905A ACUTE MEDICATION-INDUCED AKATHISIA: Status: ACTIVE | Noted: 2018-08-05

## 2019-10-08 PROBLEM — I69.30 HISTORY OF CEREBROVASCULAR ACCIDENT WITH RESIDUAL DEFICIT: Status: ACTIVE | Noted: 2018-12-13

## 2019-10-08 PROBLEM — I63.212: Status: ACTIVE | Noted: 2018-11-06

## 2019-10-08 PROBLEM — A49.02 MRSA (METHICILLIN RESISTANT STAPHYLOCOCCUS AUREUS): Status: ACTIVE | Noted: 2019-08-26

## 2019-10-08 PROBLEM — R13.12 OROPHARYNGEAL DYSPHAGIA: Status: ACTIVE | Noted: 2019-10-03

## 2019-10-08 PROBLEM — E87.5 HYPERKALEMIA: Status: ACTIVE | Noted: 2019-08-24

## 2019-10-08 PROBLEM — I77.74 DISSECTION OF VERTEBRAL ARTERY (H): Status: ACTIVE | Noted: 2019-09-05

## 2019-10-08 PROBLEM — J18.9 CAP (COMMUNITY ACQUIRED PNEUMONIA): Status: ACTIVE | Noted: 2018-12-08

## 2019-10-08 PROBLEM — I31.39 PERICARDIAL EFFUSION: Status: ACTIVE | Noted: 2019-08-25

## 2019-10-08 PROBLEM — F33.2 SEVERE EPISODE OF RECURRENT MAJOR DEPRESSIVE DISORDER, WITHOUT PSYCHOTIC FEATURES (H): Status: ACTIVE | Noted: 2018-08-04

## 2019-10-08 LAB
ALBUMIN SERPL-MCNC: 2.2 G/DL (ref 3.4–5)
ALP SERPL-CCNC: 147 U/L (ref 40–150)
ALT SERPL W P-5'-P-CCNC: 43 U/L (ref 0–50)
ANION GAP SERPL CALCULATED.3IONS-SCNC: 5 MMOL/L (ref 3–14)
AST SERPL W P-5'-P-CCNC: 23 U/L (ref 0–45)
BASE EXCESS BLDV CALC-SCNC: 7 MMOL/L
BASOPHILS # BLD AUTO: 0 10E9/L (ref 0–0.2)
BASOPHILS NFR BLD AUTO: 0.2 %
BILIRUB SERPL-MCNC: 0.2 MG/DL (ref 0.2–1.3)
BUN SERPL-MCNC: 23 MG/DL (ref 7–30)
CALCIUM SERPL-MCNC: 8.7 MG/DL (ref 8.5–10.1)
CHLORIDE SERPL-SCNC: 103 MMOL/L (ref 94–109)
CO2 SERPL-SCNC: 32 MMOL/L (ref 20–32)
CREAT SERPL-MCNC: 0.86 MG/DL (ref 0.52–1.04)
DIFFERENTIAL METHOD BLD: ABNORMAL
EOSINOPHIL # BLD AUTO: 0.1 10E9/L (ref 0–0.7)
EOSINOPHIL NFR BLD AUTO: 0.7 %
ERYTHROCYTE [DISTWIDTH] IN BLOOD BY AUTOMATED COUNT: 15.2 % (ref 10–15)
GFR SERPL CREATININE-BSD FRML MDRD: 74 ML/MIN/{1.73_M2}
GLUCOSE SERPL-MCNC: 212 MG/DL (ref 70–99)
HCO3 BLDV-SCNC: 32 MMOL/L (ref 21–28)
HCT VFR BLD AUTO: 29.5 % (ref 35–47)
HGB BLD-MCNC: 8.9 G/DL (ref 11.7–15.7)
IMM GRANULOCYTES # BLD: 0.1 10E9/L (ref 0–0.4)
IMM GRANULOCYTES NFR BLD: 0.5 %
INR PPP: 4.47 (ref 0.86–1.14)
LYMPHOCYTES # BLD AUTO: 1.2 10E9/L (ref 0.8–5.3)
LYMPHOCYTES NFR BLD AUTO: 9.2 %
MCH RBC QN AUTO: 29.8 PG (ref 26.5–33)
MCHC RBC AUTO-ENTMCNC: 30.2 G/DL (ref 31.5–36.5)
MCV RBC AUTO: 99 FL (ref 78–100)
MONOCYTES # BLD AUTO: 1.1 10E9/L (ref 0–1.3)
MONOCYTES NFR BLD AUTO: 8.8 %
NEUTROPHILS # BLD AUTO: 10 10E9/L (ref 1.6–8.3)
NEUTROPHILS NFR BLD AUTO: 80.6 %
NRBC # BLD AUTO: 0 10*3/UL
NRBC BLD AUTO-RTO: 0 /100
O2/TOTAL GAS SETTING VFR VENT: ABNORMAL %
PCO2 BLDV: 48 MM HG (ref 40–50)
PH BLDV: 7.44 PH (ref 7.32–7.43)
PLATELET # BLD AUTO: 288 10E9/L (ref 150–450)
PO2 BLDV: 51 MM HG (ref 25–47)
POTASSIUM SERPL-SCNC: 4.4 MMOL/L (ref 3.4–5.3)
PROT SERPL-MCNC: 6.4 G/DL (ref 6.8–8.8)
RBC # BLD AUTO: 2.99 10E12/L (ref 3.8–5.2)
SODIUM SERPL-SCNC: 140 MMOL/L (ref 133–144)
TROPONIN I SERPL-MCNC: <0.015 UG/L (ref 0–0.04)
WBC # BLD AUTO: 12.4 10E9/L (ref 4–11)

## 2019-10-08 PROCEDURE — 84484 ASSAY OF TROPONIN QUANT: CPT | Performed by: STUDENT IN AN ORGANIZED HEALTH CARE EDUCATION/TRAINING PROGRAM

## 2019-10-08 PROCEDURE — 82803 BLOOD GASES ANY COMBINATION: CPT | Performed by: STUDENT IN AN ORGANIZED HEALTH CARE EDUCATION/TRAINING PROGRAM

## 2019-10-08 PROCEDURE — 80053 COMPREHEN METABOLIC PANEL: CPT | Performed by: STUDENT IN AN ORGANIZED HEALTH CARE EDUCATION/TRAINING PROGRAM

## 2019-10-08 PROCEDURE — 36415 COLL VENOUS BLD VENIPUNCTURE: CPT | Performed by: STUDENT IN AN ORGANIZED HEALTH CARE EDUCATION/TRAINING PROGRAM

## 2019-10-08 PROCEDURE — 99285 EMERGENCY DEPT VISIT HI MDM: CPT | Mod: 25 | Performed by: STUDENT IN AN ORGANIZED HEALTH CARE EDUCATION/TRAINING PROGRAM

## 2019-10-08 PROCEDURE — 93005 ELECTROCARDIOGRAM TRACING: CPT | Performed by: STUDENT IN AN ORGANIZED HEALTH CARE EDUCATION/TRAINING PROGRAM

## 2019-10-08 PROCEDURE — 93010 ELECTROCARDIOGRAM REPORT: CPT | Mod: Z6 | Performed by: STUDENT IN AN ORGANIZED HEALTH CARE EDUCATION/TRAINING PROGRAM

## 2019-10-08 PROCEDURE — 85610 PROTHROMBIN TIME: CPT | Performed by: STUDENT IN AN ORGANIZED HEALTH CARE EDUCATION/TRAINING PROGRAM

## 2019-10-08 PROCEDURE — 85025 COMPLETE CBC W/AUTO DIFF WBC: CPT | Performed by: STUDENT IN AN ORGANIZED HEALTH CARE EDUCATION/TRAINING PROGRAM

## 2019-10-08 PROCEDURE — 93971 EXTREMITY STUDY: CPT | Mod: RT

## 2019-10-08 ASSESSMENT — MIFFLIN-ST. JEOR: SCORE: 1558.84

## 2019-10-08 NOTE — ED AVS SNAPSHOT
[facility] Emergency Department  [address] [address]  Phone:  [phone]  Fax:  [phone]                                    Lindsey Diggs   MRN: [id number]    Department:  [facility] Emergency Department   Date of Visit:  10/8/2019           After Visit Summary Signature Page    I have received my discharge instructions, and my questions have been answered. I have discussed any challenges I see with this plan with the nurse or doctor.    ..........................................................................................................................................  Patient/Patient Representative Signature      ..........................................................................................................................................  Patient Representative Print Name and Relationship to Patient    ..................................................               ................................................  Date                                   Time    ..........................................................................................................................................  Reviewed by Signature/Title    ...................................................              ..............................................  Date                                               Time          22EPIC Rev 08/18

## 2019-10-08 NOTE — ED TRIAGE NOTES
Just had plueral effusions and bloof clot in left arm presents with leg pain and reddened painful area to right leg sats 85% on RA, O2 applied, pt had SOA and now feeling better with O2 applied, no home O2.

## 2019-10-08 NOTE — ED NOTES
Pt tolerated wean off O2 well. RA sats 91-92%. Up with walker ambulating. Reports slight increase in SOB after road test, but states feels well enough to go home. Dr. Benjamin updated.

## 2019-10-08 NOTE — ED PROVIDER NOTES
History     Chief Complaint   Patient presents with     Leg Pain     right leg pain and swelling with questionable blood clot has been in and out of the hospital over the past two months     HPI  Lindsey Diggs is a 59 year old female with past medical history which includes vertebral artery dissection, CVA, MRSA, pleural effusion, and pericardial effusion S/P pericardial window who presents for evaluation of right lower extremity redness and swelling.  Records indicated the patient was discharged from [facility] earlier today after admission for work-up and management of shortness of breath thought secondary to pleural effusions.  She was mildly hypoxic during the emergency department triage but denies feeling short of breath.  Patient's primary concern is with regards to the redness and tenderness of her right lower extremity.  She denies chest pain, cough, lower extremity swelling or calf tenderness.  Of note, patient is prescribed anticoagulant warfarin for an upper extremity DVT diagnosed last month.    Allergies:  Allergies   Allergen Reactions     Penicillins Anaphylaxis       Problem List:    Patient Active Problem List    Diagnosis Date Noted     Oropharyngeal dysphagia 10/03/2019     Priority: Medium     Dissection of vertebral artery (H) 09/05/2019     Priority: Medium     MRSA (methicillin resistant Staphylococcus aureus) 08/26/2019     Priority: Medium     (+) nares 8/26/19       Pericardial effusion 08/25/2019     Priority: Medium     Added automatically from request for surgery [id number]       Hyperkalemia 08/24/2019     Priority: Medium     Paroxysmal atrial fibrillation (H) 08/23/2019     Priority: Medium     Abnormal cardiovascular stress test 07/19/2019     Priority: Medium     History of cerebrovascular accident with residual deficit 12/13/2018     Priority: Medium     CAP (community acquired pneumonia) 12/08/2018     Priority: Medium     Cerebrovascular accident (CVA) due to occlusion of  left vertebral artery (H) 11/06/2018     Priority: Medium     Acute medication-induced akathisia 08/05/2018     Priority: Medium     Severe episode of recurrent major depressive disorder, without psychotic features (H) 08/04/2018     Priority: Medium     Severe depression (H) 07/23/2018     Priority: Medium     Type 2 diabetes mellitus (H) 07/16/2014     Priority: Medium     Novolog 5 Units QID. SS QID: at 08, 12 and 1800: 1 Unit at 151-175, 2 Units 176-200, 3 Units 201-225, 4 Units 226-250, 5 Units 251-300. At bedtime SS: 151-200 1 Unit, 201-250 2 Units, and 251-300 3 Units.  Novolog 5 Units QID. SS QID: at 08, 12 and 1800: 1 Unit at 151-175, 2 Units 176-200, 3 Units 201-225, 4 Units 226-250, 5 Units 251-300. At bedtime SS: 151-200 1 Unit, 201-250 2 Units, and 251-300 3 Units.       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    Past Surgical History:    No past surgical history on file.    Family History:    No family history on file.    Social History:  Marital Status:   [2]  Social History     Tobacco Use     Smoking status: Not on file   Substance Use Topics     Alcohol use: Not on file     Drug use: Not on file        Medications:    blood glucose (ACCU-CHEK SMARTVIEW) test strip  cholecalciferol (VITAMIN D3) 5000 units TABS tablet  clonazePAM (KLONOPIN) 0.5 MG tablet  liraglutide (VICTOZA) 18 MG/3ML solution  metoprolol tartrate (LOPRESSOR) 50 MG tablet  mirtazapine (REMERON) 15 MG tablet  rosuvastatin (CRESTOR) 20 MG tablet          Review of Systems  Constitutional:  Negative for fever or chills.  Cardiovascular:  Negative for chest pain.  Respiratory:  Negative for cough or shortness  "of breath.  Gastrointestinal:  Negative for abdominal pain, nausea or vomiting.  Musculoskeletal: Negative for bony pain peer  Skin: Positive for tender skin lesion of right lower extremity.    All others reviewed and are negative.      Physical Exam   BP: (!) 155/81  Pulse: 85  Heart Rate: 87  Temp: 98.2  F (36.8  C)  Resp: 20  Height: 162.6 cm (5' 4\")  Weight: 98.9 kg (218 lb)  SpO2: (!) 86 %      Physical Exam  Constitutional:  Well developed, well nourished.  Appears nontoxic and in no acute distress.    HENT:  Normocephalic and atraumatic.  Symmetric in appearance.  Eyes:  Conjunctivae are normal.  Neck:  Neck supple.  Cardiovascular:  No cyanosis.  RRR.  No audible murmurs noted.  1+ bilateral lower extremity edema, symmetric in appearance and without calf tenderness.  Respiratory:  Effort normal without sign of respiratory distress.  CTAB without diminished regions.    Gastrointestinal:  Soft nondistended abdomen.  Nontender and without guarding.  No rigidity or rebound tenderness.  Negative Briseno's sign.  Negative McBurney's point.    Musculoskeletal:  Moves extremities spontaneously.  Neurological:  Patient is alert.  Skin:  Skin is warm and dry.  Warm, red, and tender subcutaneous skin lesion of the right anterior tibia/shin.  Psychiatric:  Normal mood and affect.      ED Course        Procedures               Critical Care time:  none         EKG Interpretation:      Interpreted by: Francisco J Benjamin  Time reviewed: Upon completion    Symptoms at time of EKG: Right leg lesion  Rhythm: Sinus  Rate: Normal  Axis: Normal    Conduction: None atypical   ST Segments/ T Waves: No pathologic ST-elevations or T-wave abnormalities.  Q Waves: None  Comparison to prior: None readily available    Clinical Impression: No sign of ischemia               Results for orders placed or performed during the hospital encounter of 10/08/19 (from the past 24 hour(s))   US Lower Extremity Venous Duplex Right    Narrative    " ULTRASOUND VENOUS RIGHT LOWER EXTREMITY WITH DOPPLER  10/8/2019 1:43  AM     HISTORY: Right lower extremity redness and swelling.    COMPARISON: None.    TECHNIQUE: Spectral waveform and color Doppler evaluation were  performed.    FINDINGS: Occlusive thrombus is present within a dilated superficial  vein in the anteromedial aspect of the mid right lower leg. Per report  from the ultrasound technologist, this correlates to the site of the  patient's symptoms. Normal compressibility of the right common  femoral, femoral, popliteal, posterior tibial, peroneal and greater  saphenous veins. Unremarkable Doppler waveform evaluation of the right  common femoral, femoral and popliteal veins.      Impression    IMPRESSION:  1. Occlusive thrombus is present within a dilated superficial vein in  the anteromedial aspect of the mid right lower leg, corresponding to  the site of the patient's symptoms.  2. No evidence of thrombus in the deep veins of the right lower  extremity.   Blood gas venous   Result Value Ref Range    Ph Venous 7.44 (H) 7.32 - 7.43 pH    PCO2 Venous 48 40 - 50 mm Hg    PO2 Venous 51 (H) 25 - 47 mm Hg    Bicarbonate Venous 32 (H) 21 - 28 mmol/L    Base Excess Venous 7.0 mmol/L    FIO2 3LPM via NC    CBC with platelets differential   Result Value Ref Range    WBC 12.4 (H) 4.0 - 11.0 10e9/L    RBC Count 2.99 (L) 3.8 - 5.2 10e12/L    Hemoglobin 8.9 (L) 11.7 - 15.7 g/dL    Hematocrit 29.5 (L) 35.0 - 47.0 %    MCV 99 78 - 100 fl    MCH 29.8 26.5 - 33.0 pg    MCHC 30.2 (L) 31.5 - 36.5 g/dL    RDW 15.2 (H) 10.0 - 15.0 %    Platelet Count 288 150 - 450 10e9/L    Diff Method Automated Method     % Neutrophils 80.6 %    % Lymphocytes 9.2 %    % Monocytes 8.8 %    % Eosinophils 0.7 %    % Basophils 0.2 %    % Immature Granulocytes 0.5 %    Nucleated RBCs 0 0 /100    Absolute Neutrophil 10.0 (H) 1.6 - 8.3 10e9/L    Absolute Lymphocytes 1.2 0.8 - 5.3 10e9/L    Absolute Monocytes 1.1 0.0 - 1.3 10e9/L    Absolute  Eosinophils 0.1 0.0 - 0.7 10e9/L    Absolute Basophils 0.0 0.0 - 0.2 10e9/L    Abs Immature Granulocytes 0.1 0 - 0.4 10e9/L    Absolute Nucleated RBC 0.0    Comprehensive metabolic panel   Result Value Ref Range    Sodium 140 133 - 144 mmol/L    Potassium 4.4 3.4 - 5.3 mmol/L    Chloride 103 94 - 109 mmol/L    Carbon Dioxide 32 20 - 32 mmol/L    Anion Gap 5 3 - 14 mmol/L    Glucose 212 (H) 70 - 99 mg/dL    Urea Nitrogen 23 7 - 30 mg/dL    Creatinine 0.86 0.52 - 1.04 mg/dL    GFR Estimate 74 >60 mL/min/[1.73_m2]    GFR Estimate If Black 86 >60 mL/min/[1.73_m2]    Calcium 8.7 8.5 - 10.1 mg/dL    Bilirubin Total 0.2 0.2 - 1.3 mg/dL    Albumin 2.2 (L) 3.4 - 5.0 g/dL    Protein Total 6.4 (L) 6.8 - 8.8 g/dL    Alkaline Phosphatase 147 40 - 150 U/L    ALT 43 0 - 50 U/L    AST 23 0 - 45 U/L   INR   Result Value Ref Range    INR 4.47 (H) 0.86 - 1.14   Troponin I   Result Value Ref Range    Troponin I ES <0.015 0.000 - 0.045 ug/L       Medications - No data to display    Assessments & Plan (with Medical Decision Making)   Lindsey Diggs is a 59 year old female who presents to the department for evaluation of a new tender skin lesion of the right lower extremity.  It does not appear cellulitic or infectious, formal ultrasound was performed and has identified a superficial thrombophlebitis.  Patient is anticoagulated and mildly supratherapeutic INR.  She is also borderline hypoxic but maintains she feels better since being admitted to [facility] last weekend.  Records were reviewed and oxygen saturation remained in the low 90s and discharge summary noted vital signs including an oxygen saturation of 91%.  She denies cough or respiratory infectious symptoms and has ambulated in the department without respiratory distress or subjective difficulties.  Patient would like to be discharged to comfort of her home, seems stable for discharge at this time but given return precautions.  She also plans to use warm compresses on  affected area and monitor closely for any progression of symptoms.        Disclaimer:  This note consists of symbols derived from keyboarding, dictation, and/or voice recognition software.  As a result, there may be errors in the script that have gone undetected.  Please consider this when interpreting information found in the chart.        I have reviewed the nursing notes.    I have reviewed the findings, diagnosis, plan and need for follow up with the patient.       New Prescriptions    No medications on file       Final diagnoses:   Superficial thrombosis of leg, right       10/8/2019   [facility] EMERGENCY DEPARTMENT     Francisco J Benjamin DO  10/08/19 0339